# Patient Record
Sex: FEMALE | Race: WHITE | NOT HISPANIC OR LATINO | URBAN - METROPOLITAN AREA
[De-identification: names, ages, dates, MRNs, and addresses within clinical notes are randomized per-mention and may not be internally consistent; named-entity substitution may affect disease eponyms.]

---
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
## 2021-07-19 ENCOUNTER — TRANSCRIPTION ENCOUNTER (OUTPATIENT)
Age: 21
End: 2021-07-19

## 2021-08-18 NOTE — ED PEDIATRIC NURSE NOTE - DISCHARGE DATE/TIME
Family Medicine Clinic Note  Date:  8/18/2021  PCP:  Alysa Jones DO    Chief Complaint   Patient presents with   • Office Visit     right ankle pain       HPI:  Fátima Wetzel is a 47 year old female presenting for the following:    Right ankle swelling for 3 weeks, following ankle inversion.  Swelling has remained consistent and has not reduced or intensified.  Swelling was not treated with anything.  Fátima thinks she has sprained her ankle before, but could not recall specifics. She wears tennis shoes at work and enjoys wearing sandals around when not working. She was wearing tennis shoes when she sprained her ankle.     Social history: oldest daughter is , other kids are school age.  Joselo. 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 Has part time cleaning business.  Diet: doesn't feel like her diet is where she wants it to be, eats when she's stressed  Alcohol: 1x montly  Tobacco: none  Recreational Drugs: none  Sleep: not enough sleep - 5 hours and not restful  Movement: active job, but not exercising    ROS  The following systems reviewed and negative apart from what was mentioned in the HPI:      Problem List, Pertinent Surgical History, Family History, Allergies, Current Medications, as well as nursing notes were all reviewed and updated as indicated.    PHYSICAL EXAM:    Visit Vitals  /78   Pulse 70   Temp 98.1 °F (36.7 °C)   Resp 16   Ht 5' 10\" (1.778 m)   Wt 108.4 kg   LMP 01/14/2016   SpO2 98%   BMI 34.29 kg/m²        Vital Signs  Constitutional: Well developed, appearing female, in no apparent distress  Pulm: No increased work of breathing  CV: No palpations noted  MSK: ROM restricted in the right ankle, with point tenderness beneath the right lateral malleolus. No pain with inversion and eversion of both feet.  Strength testing: dorsiflexion and plantarflexion both 5/5  bilaterally. Heel walking did not induce pain, though toe walking did.  Calf raises performed on the right foot triggered pain.  Left foot exam was not significant.  Swelling localized to the lateral malleolus, no erythema or induration  Derm: erythematous papular rash on arms and legs  Neuro: Cognition and memory intact, mood congruent      ASSESSMENT & PLAN:  Fátima was seen today for office visit.    Diagnoses and all orders for this visit:    Sprain of right ankle, unspecified ligament, initial encounter  Right ankle sprain due to ligamentous tear with swelling for 3 weeks. No red flags currently to indicate imaging or further urgent eval  - take ibuprofen prn  - ice and rest ankle  - wear compression sock for ankle support  - perform ankle mobility exercises and stretches  - limit strenuous activity that places a lot of tension on the ankle, but continue with daily movement, such as walking   - return if swelling does not reduce within a couple of weeks and explore imaging and orthopedics/podiatry referral      Health Maintenance:  · Cervical Cancer Screening - NA, had total hysterectomy 2016  · Mammogram - done and normal 110/9/2020.  · Immunizations: due for PPSV23 (hx of asthma). did not discuss  · HIV screening ages 13-64: NR 2/5/13  · ASCVD 0.3% - no statin indicated. Repeat labs today   · Advance Directive: Not on file. Reminded Fátima to bring that in previously.      FOLLOW-UP:  Return in about 8 months (around 5/1/2022) for Annual Physical & sooner if needed. Ok to provide refills through that time as long as symptoms and labs are stable.    Sivan Thomas  [facility]  8/18/2021  formerly [facility]    Teaching Attestation:  I have personally interviewed and examined the patient via face-to-face. I confirmed the findings listed below:    • Sprain of right ankle, unspecified ligament, initial encounter  (primary encounter diagnosis)     This was discussed with the student and I agree  with the assessment and plan as documented. The plan of care was discussed with the patient.     Alysa Jones, DO   Family Medicine  [facility]  8/18/2021     03-Sep-2017 00:52

## 2021-10-22 PROBLEM — F41.9 ANXIETY DISORDER, UNSPECIFIED: Chronic | Status: ACTIVE | Noted: 2019-06-19

## 2021-10-22 PROBLEM — F98.8 OTHER SPECIFIED BEHAVIORAL AND EMOTIONAL DISORDERS WITH ONSET USUALLY OCCURRING IN CHILDHOOD AND ADOLESCENCE: Chronic | Status: ACTIVE | Noted: 2019-06-19

## 2021-11-17 ENCOUNTER — APPOINTMENT (OUTPATIENT)
Dept: PSYCHIATRY | Facility: CLINIC | Age: 21
End: 2021-11-17
Payer: COMMERCIAL

## 2021-11-17 DIAGNOSIS — Z86.69 PERSONAL HISTORY OF OTHER DISEASES OF THE NERVOUS SYSTEM AND SENSE ORGANS: ICD-10-CM

## 2021-11-17 PROCEDURE — 99205 OFFICE O/P NEW HI 60 MIN: CPT

## 2021-12-15 ENCOUNTER — APPOINTMENT (OUTPATIENT)
Dept: PSYCHIATRY | Facility: CLINIC | Age: 21
End: 2021-12-15
Payer: COMMERCIAL

## 2021-12-15 PROCEDURE — 99214 OFFICE O/P EST MOD 30 MIN: CPT

## 2022-01-05 ENCOUNTER — APPOINTMENT (OUTPATIENT)
Dept: PSYCHIATRY | Facility: CLINIC | Age: 22
End: 2022-01-05
Payer: COMMERCIAL

## 2022-01-05 PROCEDURE — 99214 OFFICE O/P EST MOD 30 MIN: CPT

## 2022-02-03 NOTE — ED PEDIATRIC NURSE NOTE - BREATHING, MLM
Hold warfarin tonight, then continue current warfarin dose of 7 mg daily (49 mg/week) beginning tomorrow.  Recheck INR in 2 weeks.  Watch closely for signs of bleeding and be careful not to fall, hit your head, bump yourself or cut yourself.   Spontaneous, unlabored and symmetrical

## 2022-02-09 ENCOUNTER — APPOINTMENT (OUTPATIENT)
Dept: PSYCHIATRY | Facility: CLINIC | Age: 22
End: 2022-02-09

## 2022-02-16 ENCOUNTER — APPOINTMENT (OUTPATIENT)
Dept: PSYCHIATRY | Facility: CLINIC | Age: 22
End: 2022-02-16
Payer: COMMERCIAL

## 2022-02-16 PROCEDURE — 99214 OFFICE O/P EST MOD 30 MIN: CPT | Mod: 95

## 2022-03-24 ENCOUNTER — APPOINTMENT (OUTPATIENT)
Dept: PSYCHIATRY | Facility: CLINIC | Age: 22
End: 2022-03-24
Payer: COMMERCIAL

## 2022-03-24 PROCEDURE — 99214 OFFICE O/P EST MOD 30 MIN: CPT | Mod: 95

## 2022-05-11 ENCOUNTER — APPOINTMENT (OUTPATIENT)
Dept: PSYCHIATRY | Facility: CLINIC | Age: 22
End: 2022-05-11
Payer: COMMERCIAL

## 2022-05-11 PROCEDURE — 99214 OFFICE O/P EST MOD 30 MIN: CPT | Mod: 95

## 2022-07-13 ENCOUNTER — APPOINTMENT (OUTPATIENT)
Dept: PSYCHIATRY | Facility: CLINIC | Age: 22
End: 2022-07-13

## 2022-07-13 PROCEDURE — 99214 OFFICE O/P EST MOD 30 MIN: CPT | Mod: 95

## 2022-09-08 ENCOUNTER — APPOINTMENT (OUTPATIENT)
Dept: PSYCHIATRY | Facility: CLINIC | Age: 22
End: 2022-09-08

## 2022-10-04 ENCOUNTER — APPOINTMENT (OUTPATIENT)
Dept: PSYCHIATRY | Facility: CLINIC | Age: 22
End: 2022-10-04

## 2022-10-20 ENCOUNTER — APPOINTMENT (OUTPATIENT)
Dept: PSYCHIATRY | Facility: CLINIC | Age: 22
End: 2022-10-20

## 2022-10-20 PROCEDURE — 99214 OFFICE O/P EST MOD 30 MIN: CPT | Mod: 95

## 2022-10-20 NOTE — HISTORY OF PRESENT ILLNESS
[Home] : at home, [unfilled] , at the time of the visit. [Medical Office: ([facility])___] : at the medical office located in  [Verbal consent obtained from patient] : the patient, [unfilled] [FreeTextEntry1] : Patient is a 21 year old female, dating her boyfriend, employed as , domiciled with biological parents and 2 siblings (brother 18 and sister 16 ) currently in year 3/ 5 year program at [employer], with PMHx of migraines, sports induced asthma, and PPHx ADHD and anxiety, no previous psychiatric hospitalization, no previous suicide attempts, no history of self injurious behavior, currently not in treatment, was recently seen by Dr Claudia Lyons, referred to clinic for anxiety for second opinion. \par \par Since last visit, pt was continued on previous dose of zoloft and concerta.  Today, pt presents upset. she states she got kicked out of her boyfriends house where she was living, and now she is not sure where she and her boyfriend stand. He told her he needs to figure things out. She is really upset by this. She is still able to tend to herself and work.\par \par No si/hi. No issues with sleep or appetite. No manic or psychotic symptoms appreciated or endorsed. \par \par Risk Assessment: Low risk for suicide/ aggression both acutely and chronically.\par RISK Factors: anxiety, family conflict\par PROTECTIVE Factors: no previous attempt, no access to lethal means/ no access to firearm, no substance abuse, no legal history, no history of aggression, does not present with vindictive intent, no SI/HI, no psychosis, positive therapeutic relationship, engaged in school/work, reality testing intact, good social support, future oriented, no previous suicide attempts or violent history
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
## 2022-10-20 NOTE — PHYSICAL EXAM
[None] : none [Cooperative] : cooperative [Clear] : clear [Linear/Goal Directed] : linear/goal directed [Average] : average [WNL] : within normal limits [FreeTextEntry8] : "im not doing my best" [de-identified] : mood congruent, upset

## 2022-11-17 ENCOUNTER — APPOINTMENT (OUTPATIENT)
Dept: PSYCHIATRY | Facility: CLINIC | Age: 22
End: 2022-11-17

## 2022-11-17 PROCEDURE — 99214 OFFICE O/P EST MOD 30 MIN: CPT | Mod: 95
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
## 2022-11-17 NOTE — PHYSICAL EXAM
[None] : none [Cooperative] : cooperative [Euthymic] : euthymic [Full] : full [Clear] : clear [Linear/Goal Directed] : linear/goal directed [Average] : average [WNL] : within normal limits [FreeTextEntry8] : "better"

## 2022-11-17 NOTE — HISTORY OF PRESENT ILLNESS
[Home] : at home, [unfilled] , at the time of the visit. [Medical Office: ([facility])___] : at the medical office located in  [Verbal consent obtained from patient] : the patient, [unfilled] [FreeTextEntry1] : Patient is a 21 year old female, dating her boyfriend, employed as , domiciled with biological parents and 2 siblings (brother 18 and sister 16 ) currently in year 3/ 5 year program at [employer], with PMHx of migraines, sports induced asthma, and PPHx ADHD and anxiety, no previous psychiatric hospitalization, no previous suicide attempts, no history of self injurious behavior, currently not in treatment, was recently seen by Dr Claudia Lyons, referred to clinic for anxiety for second opinion. \par \par Since last visit, pt was continued on previous dose of zoloft and concerta.  Today, pt presents euthymic. She states everything is still the same. She is still  from her boyfriend and living at home. She is catching up on her school work and she is trying to focus on herself.\par \par No si/hi. No issues with sleep or appetite. No manic or psychotic symptoms appreciated or endorsed. \par \par Risk Assessment: Low risk for suicide/ aggression both acutely and chronically.\par RISK Factors: anxiety, family conflict\par PROTECTIVE Factors: no previous attempt, no access to lethal means/ no access to firearm, no substance abuse, no legal history, no history of aggression, does not present with vindictive intent, no SI/HI, no psychosis, positive therapeutic relationship, engaged in school/work, reality testing intact, good social support, future oriented, no previous suicide attempts or violent history

## 2022-12-21 ENCOUNTER — APPOINTMENT (OUTPATIENT)
Dept: PSYCHIATRY | Facility: CLINIC | Age: 22
End: 2022-12-21

## 2023-01-19 ENCOUNTER — APPOINTMENT (OUTPATIENT)
Dept: PSYCHIATRY | Facility: CLINIC | Age: 23
End: 2023-01-19

## 2023-01-19 NOTE — H&P PST PEDIATRIC - FUNCTIONAL SCREEN CURRENT LEVEL: DRESSING, MLM
[FreeTextEntry1] : Patient presents for med renewal  [de-identified] : 62 year old female with pmhx of anxiety on two different antidepressants >5 years presents in office with her  with complaints of memory loss, scattered sentences, easy agitation, and frequently loss of focus.  states symptoms have been getting worse lately especially agitation. They are wondering if these symptoms are associated with the medications she is taking. She had COVID 2 Decembers ago in 2021 and feels that she has not had energy since. She is not motivated to exercise or help herself and feels down. Currently they are in marriage counseling and she has tried to find a therapist for herself but has had extreme difficulty. She considers herself a worrier, she is worried about the future group home of her and her , her husbands health, the care of her grand child, and feels that even if presented with a very good situation she could find something negative or something to worry about. She has b/l hearing aids and feels that her agitation is heightened by not being able to hear. She feels the hearing aids are not the best but cannot describe exactly what is wrong with them which makes it harder to hear. [facility]. She does not work out much and knows her diet can have improvement. \par \par \par Presenting in office for medication renewals, she has excema and athletes food and cannot get in with her dermatologist and is wondering if they can be renewed here.  0 = independent

## 2023-01-25 ENCOUNTER — APPOINTMENT (OUTPATIENT)
Dept: PSYCHIATRY | Facility: CLINIC | Age: 23
End: 2023-01-25
Payer: COMMERCIAL

## 2023-01-25 PROCEDURE — 99214 OFFICE O/P EST MOD 30 MIN: CPT | Mod: 95
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
## 2023-01-25 NOTE — HISTORY OF PRESENT ILLNESS
[FreeTextEntry1] : Patient is a 22 year old female, dating her boyfriend, employed as , domiciled with biological parents and 2 siblings (brother 18 and sister 16 ) currently in year 3/ 5 year program at [employer], with PMHx of migraines, sports induced asthma, and PPHx ADHD and anxiety, no previous psychiatric hospitalization, no previous suicide attempts, no history of self injurious behavior, currently not in treatment, was recently seen by Dr Claudia Lyons, referred to clinic for anxiety for second opinion. \par \par Since last visit, pt was continued on previous dose of zoloft and concerta.  Today, pt presents euthymic. She states she is doing much better. She is happy, focused on school and work. She has noticed zapping sensation even on days where she doesn’t forget to take her meds. She will try to make time window time of when she takes her zoloft more narrow. \par \par No si/hi. No issues with sleep or appetite. No manic or psychotic symptoms appreciated or endorsed. \par \par Risk Assessment: Low risk for suicide/ aggression both acutely and chronically.\par RISK Factors: anxiety, family conflict\par PROTECTIVE Factors: no previous attempt, no access to lethal means/ no access to firearm, no substance abuse, no legal history, no history of aggression, does not present with vindictive intent, no SI/HI, no psychosis, positive therapeutic relationship, engaged in school/work, reality testing intact, good social support, future oriented, no previous suicide attempts or violent history [Home] : at home, [unfilled] , at the time of the visit. [Medical Office: ([facility])___] : at the medical office located in  [Verbal consent obtained from patient] : the patient, [unfilled]

## 2023-03-23 ENCOUNTER — APPOINTMENT (OUTPATIENT)
Dept: PSYCHIATRY | Facility: CLINIC | Age: 23
End: 2023-03-23
Payer: COMMERCIAL

## 2023-03-23 PROCEDURE — 99214 OFFICE O/P EST MOD 30 MIN: CPT | Mod: 95

## 2023-03-23 NOTE — HISTORY OF PRESENT ILLNESS
[FreeTextEntry1] : Patient is a 22 year old female, dating her boyfriend, employed as , domiciled with biological parents and 2 siblings (brother 18 and sister 16 ) currently in year 3/ 5 year program at [employer], with PMHx of migraines, sports induced asthma, and PPHx ADHD and anxiety, no previous psychiatric hospitalization, no previous suicide attempts, no history of self injurious behavior, currently not in treatment, was recently seen by Dr Claudia Lyons, referred to clinic for anxiety for second opinion. \par \par Since last visit, pt was continued on previous dose of zoloft and concerta.  Today, pt presents euthymic. She states she is doing great. 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 She enjoys it. [place] is going well as well. She is on spring break at the moment.\par \par No si/hi. No issues with sleep or appetite. No manic or psychotic symptoms appreciated or endorsed. \par \par Risk Assessment: Low risk for suicide/ aggression both acutely and chronically.\par RISK Factors: anxiety, family conflict\par PROTECTIVE Factors: no previous attempt, no access to lethal means/ no access to firearm, no substance abuse, no legal history, no history of aggression, does not present with vindictive intent, no SI/HI, no psychosis, positive therapeutic relationship, engaged in school/work, reality testing intact, good social support, future oriented, no previous suicide attempts or violent history [Home] : at home, [unfilled] , at the time of the visit. [Medical Office: ([facility])___] : at the medical office located in  [Verbal consent obtained from patient] : the patient, [unfilled]
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
## 2023-04-25 ENCOUNTER — NON-APPOINTMENT (OUTPATIENT)
Age: 23
End: 2023-04-25

## 2023-06-21 ENCOUNTER — APPOINTMENT (OUTPATIENT)
Dept: PSYCHIATRY | Facility: CLINIC | Age: 23
End: 2023-06-21
Payer: COMMERCIAL

## 2023-06-21 PROCEDURE — 99214 OFFICE O/P EST MOD 30 MIN: CPT | Mod: 95
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
## 2023-06-21 NOTE — HISTORY OF PRESENT ILLNESS
[FreeTextEntry1] : Patient is a 22 year old female, dating her boyfriend, employed as , domiciled with biological parents and 2 siblings (brother 18 and sister 16 ) currently in year 3/ 5 year program at [employer], with PMHx of migraines, sports induced asthma, and PPHx ADHD and anxiety, no previous psychiatric hospitalization, no previous suicide attempts, no history of self injurious behavior, currently not in treatment, was recently seen by Dr Claudia Lyons, referred to clinic for anxiety for second opinion. \par \par Since last visit, pt was continued on previous dose of zoloft and concerta.  Today, pt presents euthymic. She states she is doing great. She is enjoying the summer with her friends.\par \par No si/hi. No issues with sleep or appetite. No manic or psychotic symptoms appreciated or endorsed. \par \par Risk Assessment: Low risk for suicide/ aggression both acutely and chronically.\par RISK Factors: anxiety, family conflict\par PROTECTIVE Factors: no previous attempt, no access to lethal means/ no access to firearm, no substance abuse, no legal history, no history of aggression, does not present with vindictive intent, no SI/HI, no psychosis, positive therapeutic relationship, engaged in school/work, reality testing intact, good social support, future oriented, no previous suicide attempts or violent history [Home] : at home, [unfilled] , at the time of the visit. [Medical Office: ([facility])___] : at the medical office located in  [Verbal consent obtained from patient] : the patient, [unfilled]

## 2023-09-21 ENCOUNTER — APPOINTMENT (OUTPATIENT)
Dept: PSYCHIATRY | Facility: CLINIC | Age: 23
End: 2023-09-21
Payer: COMMERCIAL

## 2023-09-21 PROCEDURE — 99214 OFFICE O/P EST MOD 30 MIN: CPT

## 2023-09-21 RX ORDER — METHYLPHENIDATE HYDROCHLORIDE 27 MG/1
27 TABLET, EXTENDED RELEASE ORAL DAILY
Qty: 30 | Refills: 0 | Status: ACTIVE | COMMUNITY
Start: 1900-01-01 | End: 1900-01-01

## 2023-10-04 ENCOUNTER — NON-APPOINTMENT (OUTPATIENT)
Age: 23
End: 2023-10-04

## 2023-10-30 ENCOUNTER — NON-APPOINTMENT (OUTPATIENT)
Age: 23
End: 2023-10-30

## 2023-12-13 ENCOUNTER — APPOINTMENT (OUTPATIENT)
Dept: PSYCHIATRY | Facility: CLINIC | Age: 23
End: 2023-12-13
Payer: COMMERCIAL

## 2023-12-13 PROCEDURE — 99214 OFFICE O/P EST MOD 30 MIN: CPT | Mod: 95

## 2023-12-13 NOTE — PLAN
[FreeTextEntry5] :  -counseling and support provided -will continue zoloft at 125mg, pt tolerating meds well. -continue concerta 27mg po daily due to side effects -pt referred for talk therapy. - pending -er/911 prn -f/u 12 weeks

## 2023-12-13 NOTE — HISTORY OF PRESENT ILLNESS
[FreeTextEntry1] : Patient is a 22 year old female, dating her boyfriend, employed as , domiciled with biological parents and 2 siblings (brother 18 and sister 16 ) currently in year 3/ 5 year program at [employer], with PMHx of migraines, sports induced asthma, and PPHx ADHD and anxiety, no previous psychiatric hospitalization, no previous suicide attempts, no history of self injurious behavior, currently not in treatment, was recently seen by Dr Claudia Lyons, referred to clinic for anxiety for second opinion.   Since last visit, pt was continued on previous dose of zoloft and concerta.  Today, pt presents euthymic. She states she is doing great. She is in the middle of finals. She is prepared. She also got a new job as a .   No si/hi. No issues with sleep or appetite. No manic or psychotic symptoms appreciated or endorsed.   Risk Assessment: Low risk for suicide/ aggression both acutely and chronically. RISK Factors: anxiety, family conflict PROTECTIVE Factors: no previous attempt, no access to lethal means/ no access to firearm, no substance abuse, no legal history, no history of aggression, does not present with vindictive intent, no SI/HI, no psychosis, positive therapeutic relationship, engaged in school/work, reality testing intact, good social support, future oriented, no previous suicide attempts or violent history

## 2023-12-13 NOTE — REASON FOR VISIT
[Telehealth (audio & video) - Individual/Group] : This visit was provided via telehealth using real-time 2-way audio visual technology. [Medical Office: ([facility])___] : The provider was located at the medical office in [unfilled]. [Home] : The patient, [unfilled], was located at home, [unfilled], at the time of the visit. [Patient] : Patient [FreeTextEntry1] : adhd/anxiety

## 2024-02-13 ENCOUNTER — NON-APPOINTMENT (OUTPATIENT)
Age: 24
End: 2024-02-13

## 2024-03-05 ENCOUNTER — APPOINTMENT (OUTPATIENT)
Dept: PSYCHIATRY | Facility: CLINIC | Age: 24
End: 2024-03-05
Payer: COMMERCIAL

## 2024-03-05 PROCEDURE — 99214 OFFICE O/P EST MOD 30 MIN: CPT | Mod: 95

## 2024-03-05 NOTE — REASON FOR VISIT
[Telehealth (audio & video) - Individual/Group] : This visit was provided via telehealth using real-time 2-way audio visual technology. [Medical Office: ([facility])___] : The provider was located at the medical office in [unfilled]. [Home] : The patient, [unfilled], was located at home, [unfilled], at the time of the visit. [Patient] : Patient [FreeTextEntry1] : adhd/anxiety

## 2024-03-05 NOTE — HISTORY OF PRESENT ILLNESS
[FreeTextEntry1] : Patient is a 22 year old female, dating her boyfriend, employed as , domiciled with biological parents and 2 siblings (brother 18 and sister 16 ) currently in year 3/ 5 year program at [employer], with PMHx of migraines, sports induced asthma, and PPHx ADHD and anxiety, no previous psychiatric hospitalization, no previous suicide attempts, no history of self injurious behavior, currently not in treatment, was recently seen by Dr Claudia Lyons, referred to clinic for anxiety for second opinion.   Since last visit, pt was continued on previous dose of zoloft and concerta.  Today, pt presents euthymic. She states she is doing ok however she is having some issues with one class and a professor. That can be anxiety provoking and triggering.   She still continues to work and she feels well besides for school stressor.   No si/hi. No issues with sleep or appetite. No manic or psychotic symptoms appreciated or endorsed.   Risk Assessment: Low risk for suicide/ aggression both acutely and chronically. RISK Factors: anxiety, family conflict PROTECTIVE Factors: no previous attempt, no access to lethal means/ no access to firearm, no substance abuse, no legal history, no history of aggression, does not present with vindictive intent, no SI/HI, no psychosis, positive therapeutic relationship, engaged in school/work, reality testing intact, good social support, future oriented, no previous suicide attempts or violent history

## 2024-03-05 NOTE — PLAN
[FreeTextEntry5] :  -counseling and support provided -will continue zoloft at 125mg, pt tolerating meds well. -no reported side effects -continue concerta 27mg po daily due to side effects -pt referred for talk therapy. - pending -er/911 prn -f/u 8-12  weeks

## 2024-03-05 NOTE — PHYSICAL EXAM
[None] : none [Cooperative] : cooperative [Full] : full [Euthymic] : euthymic [Clear] : clear [Linear/Goal Directed] : linear/goal directed [Average] : average [WNL] : within normal limits

## 2024-03-27 ENCOUNTER — APPOINTMENT (OUTPATIENT)
Dept: PSYCHIATRY | Facility: CLINIC | Age: 24
End: 2024-03-27

## 2024-03-28 ENCOUNTER — NON-APPOINTMENT (OUTPATIENT)
Age: 24
End: 2024-03-28

## 2024-05-01 ENCOUNTER — APPOINTMENT (OUTPATIENT)
Dept: PSYCHIATRY | Facility: CLINIC | Age: 24
End: 2024-05-01
Payer: COMMERCIAL

## 2024-05-01 DIAGNOSIS — F43.21 ADJUSTMENT DISORDER WITH DEPRESSED MOOD: ICD-10-CM

## 2024-05-01 DIAGNOSIS — F41.9 ANXIETY DISORDER, UNSPECIFIED: ICD-10-CM

## 2024-05-01 DIAGNOSIS — F90.2 ATTENTION-DEFICIT HYPERACTIVITY DISORDER, COMBINED TYPE: ICD-10-CM

## 2024-05-01 PROCEDURE — 99214 OFFICE O/P EST MOD 30 MIN: CPT | Mod: 95

## 2024-05-01 NOTE — PLAN
[FreeTextEntry5] : -counseling and support provided -will continue zoloft at 125mg, pt tolerating meds well. -no reported side effects -continue concerta 27mg po daily due to side effects -pt referred for talk therapy. - pending -er/911 prn -f/u 12 weeks

## 2024-05-01 NOTE — REASON FOR VISIT
[Telehealth (audio & video) - Individual/Group] : This visit was provided via telehealth using real-time 2-way audio visual technology. [Medical Office: ([facility])___] : The provider was located at the medical office in [unfilled]. [Home] : The patient, [unfilled], was located at home, [unfilled], at the time of the visit. [Verbal consent obtained from patient/other participant(s)] : Verbal consent for telehealth/telephonic services obtained from patient/other participant(s) [Patient] : Patient [FreeTextEntry1] : adhd/anxiety

## 2024-05-01 NOTE — HISTORY OF PRESENT ILLNESS
[FreeTextEntry1] : Patient is a 22 year old female, dating her boyfriend, employed as , domiciled with biological parents and 2 siblings (brother 18 and sister 16 ) currently in year 3/ 5 year program at [employer], with PMHx of migraines, sports induced asthma, and PPHx ADHD and anxiety, no previous psychiatric hospitalization, no previous suicide attempts, no history of self injurious behavior, currently not in treatment, was recently seen by Dr Claudia Lyons, referred to clinic for anxiety for second opinion.   Since last visit, pt was continued on previous dose of zoloft and concerta.  Today, pt presents euthymic. She states she is feeling a lot better as her father allowed her back into the house 2 weeks ago. She is almost done with school. For the summer she will work and maybe go to [place] for a few weeks.  No si/hi. No issues with sleep or appetite. No manic or psychotic symptoms appreciated or endorsed.   Risk Assessment: Low risk for suicide/ aggression both acutely and chronically. RISK Factors: anxiety, family conflict PROTECTIVE Factors: no previous attempt, no access to lethal means/ no access to firearm, no substance abuse, no legal history, no history of aggression, does not present with vindictive intent, no SI/HI, no psychosis, positive therapeutic relationship, engaged in school/work, reality testing intact, good social support, future oriented, no previous suicide attempts or violent history

## 2024-06-25 RX ORDER — SERTRALINE HYDROCHLORIDE 100 MG/1
100 TABLET, FILM COATED ORAL
Qty: 90 | Refills: 0 | Status: ACTIVE | COMMUNITY
Start: 2021-11-17 | End: 1900-01-01

## 2024-06-25 RX ORDER — SERTRALINE 25 MG/1
25 TABLET, FILM COATED ORAL
Qty: 90 | Refills: 0 | Status: ACTIVE | COMMUNITY
Start: 2021-11-17 | End: 1900-01-01

## 2024-06-25 RX ORDER — METHYLPHENIDATE HYDROCHLORIDE 27 MG/1
27 TABLET, EXTENDED RELEASE ORAL DAILY
Qty: 30 | Refills: 0 | Status: ACTIVE | COMMUNITY
Start: 2021-11-17 | End: 1900-01-01

## 2024-06-26 ENCOUNTER — NON-APPOINTMENT (OUTPATIENT)
Age: 24
End: 2024-06-26

## 2024-08-06 ENCOUNTER — APPOINTMENT (OUTPATIENT)
Dept: PSYCHIATRY | Facility: CLINIC | Age: 24
End: 2024-08-06

## 2024-08-06 PROCEDURE — 99214 OFFICE O/P EST MOD 30 MIN: CPT | Mod: 95

## 2024-08-06 NOTE — REASON FOR VISIT
[Telehealth (audio & video) - Individual/Group] : This visit was provided via telehealth using real-time 2-way audio visual technology. [Medical Office: ([facility])___] : The provider was located at the medical office in [unfilled]. [Home] : The patient, [unfilled], was located at home, [unfilled], at the time of the visit. [Verbal consent obtained from patient/other participant(s)] : Verbal consent for telehealth/telephonic services obtained from patient/other participant(s) [Patient] : Patient [FreeTextEntry1] : anxiety/adhd

## 2024-08-06 NOTE — HISTORY OF PRESENT ILLNESS
[FreeTextEntry1] : Patient is a 22 year old female, dating her boyfriend, employed as , domiciled with biological parents and 2 siblings (brother 18 and sister 16 ) currently in year 3/ 5 year program at [employer], with PMHx of migraines, sports induced asthma, and PPHx ADHD and anxiety, no previous psychiatric hospitalization, no previous suicide attempts, no history of self injurious behavior, currently not in treatment, was recently seen by Dr Claudia Lyons, referred to clinic for anxiety for second opinion.   Since last visit, pt was continued on previous dose of zoloft and concerta.  Today, pt presents euthymic. She states she has been having a really good summer. She has been traveling, even with family. Things have settled down. She takes her meds and they help.   No si/hi. No issues with sleep or appetite. No manic or psychotic symptoms appreciated or endorsed.   Risk Assessment: Low risk for suicide/ aggression both acutely and chronically. RISK Factors: anxiety, family conflict PROTECTIVE Factors: no previous attempt, no access to lethal means/ no access to firearm, no substance abuse, no legal history, no history of aggression, does not present with vindictive intent, no SI/HI, no psychosis, positive therapeutic relationship, engaged in school/work, reality testing intact, good social support, future oriented, no previous suicide attempts or violent history

## 2024-09-12 ENCOUNTER — NON-APPOINTMENT (OUTPATIENT)
Age: 24
End: 2024-09-12

## 2024-11-19 ENCOUNTER — APPOINTMENT (OUTPATIENT)
Dept: PSYCHIATRY | Facility: CLINIC | Age: 24
End: 2024-11-19
Payer: COMMERCIAL

## 2024-11-19 DIAGNOSIS — F43.21 ADJUSTMENT DISORDER WITH DEPRESSED MOOD: ICD-10-CM

## 2024-11-19 DIAGNOSIS — F90.2 ATTENTION-DEFICIT HYPERACTIVITY DISORDER, COMBINED TYPE: ICD-10-CM

## 2024-11-19 DIAGNOSIS — F41.9 ANXIETY DISORDER, UNSPECIFIED: ICD-10-CM

## 2024-11-19 PROCEDURE — 99214 OFFICE O/P EST MOD 30 MIN: CPT | Mod: 95

## 2025-01-06 ENCOUNTER — NON-APPOINTMENT (OUTPATIENT)
Age: 25
End: 2025-01-06

## 2025-02-11 ENCOUNTER — APPOINTMENT (OUTPATIENT)
Dept: PSYCHIATRY | Facility: CLINIC | Age: 25
End: 2025-02-11
Payer: COMMERCIAL

## 2025-02-11 DIAGNOSIS — F41.9 ANXIETY DISORDER, UNSPECIFIED: ICD-10-CM

## 2025-02-11 DIAGNOSIS — F43.21 ADJUSTMENT DISORDER WITH DEPRESSED MOOD: ICD-10-CM

## 2025-02-11 DIAGNOSIS — F90.2 ATTENTION-DEFICIT HYPERACTIVITY DISORDER, COMBINED TYPE: ICD-10-CM

## 2025-02-11 PROCEDURE — 99214 OFFICE O/P EST MOD 30 MIN: CPT | Mod: 95

## 2025-04-09 ENCOUNTER — NON-APPOINTMENT (OUTPATIENT)
Age: 25
End: 2025-04-09

## 2025-04-28 ENCOUNTER — NON-APPOINTMENT (OUTPATIENT)
Age: 25
End: 2025-04-28

## 2025-05-08 ENCOUNTER — NON-APPOINTMENT (OUTPATIENT)
Age: 25
End: 2025-05-08

## 2025-05-09 ENCOUNTER — NON-APPOINTMENT (OUTPATIENT)
Age: 25
End: 2025-05-09

## 2025-06-04 ENCOUNTER — APPOINTMENT (OUTPATIENT)
Dept: PSYCHIATRY | Facility: CLINIC | Age: 25
End: 2025-06-04
Payer: COMMERCIAL

## 2025-06-04 DIAGNOSIS — F90.2 ATTENTION-DEFICIT HYPERACTIVITY DISORDER, COMBINED TYPE: ICD-10-CM

## 2025-06-04 DIAGNOSIS — F41.9 ANXIETY DISORDER, UNSPECIFIED: ICD-10-CM

## 2025-06-04 DIAGNOSIS — F43.21 ADJUSTMENT DISORDER WITH DEPRESSED MOOD: ICD-10-CM

## 2025-06-04 PROCEDURE — G2211 COMPLEX E/M VISIT ADD ON: CPT | Mod: NC,95

## 2025-06-04 PROCEDURE — 99214 OFFICE O/P EST MOD 30 MIN: CPT | Mod: 95

## 2025-06-10 ENCOUNTER — NON-APPOINTMENT (OUTPATIENT)
Age: 25
End: 2025-06-10

## 2025-08-22 ENCOUNTER — NON-APPOINTMENT (OUTPATIENT)
Age: 25
End: 2025-08-22